# Patient Record
Sex: FEMALE | Race: WHITE | URBAN - METROPOLITAN AREA
[De-identification: names, ages, dates, MRNs, and addresses within clinical notes are randomized per-mention and may not be internally consistent; named-entity substitution may affect disease eponyms.]

---

## 2020-12-18 ENCOUNTER — TESTING ONLY (OUTPATIENT)
Dept: URBAN - METROPOLITAN AREA CLINIC 33 | Facility: CLINIC | Age: 30
End: 2020-12-18

## 2020-12-18 ENCOUNTER — OFFICE VISIT (OUTPATIENT)
Dept: URBAN - METROPOLITAN AREA CLINIC 33 | Facility: CLINIC | Age: 30
End: 2020-12-18
Payer: COMMERCIAL

## 2020-12-18 DIAGNOSIS — H52.223 REGULAR ASTIGMATISM, BILATERAL: Primary | ICD-10-CM

## 2020-12-18 DIAGNOSIS — H18.613 KERATOCONUS, STABLE, BILATERAL: Primary | ICD-10-CM

## 2020-12-18 PROCEDURE — 76514 ECHO EXAM OF EYE THICKNESS: CPT | Performed by: OPHTHALMOLOGY

## 2020-12-18 PROCEDURE — 99203 OFFICE O/P NEW LOW 30 MIN: CPT | Performed by: OPHTHALMOLOGY

## 2020-12-18 PROCEDURE — 92025 CPTRIZED CORNEAL TOPOGRAPHY: CPT | Performed by: OPHTHALMOLOGY

## 2020-12-18 ASSESSMENT — KERATOMETRY
OS: 47.13
OD: 50.88

## 2020-12-18 ASSESSMENT — INTRAOCULAR PRESSURE
OS: 11
OD: 10
OS: 11
OD: 10

## 2020-12-18 ASSESSMENT — VISUAL ACUITY
OS: 20/50
OD: 20/60

## 2020-12-18 NOTE — IMPRESSION/PLAN
Impression: Regular astigmatism, bilateral: H52.223. 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 Issued new prescription. Recommend crosslinking evaluation and scleral CL.

## 2020-12-18 NOTE — IMPRESSION/PLAN
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 Condition: quality of life issue. Vision: vision affected. Plan: Discussed diagnosis in detail with patient. Discussed risks of progression. Discussed treatment options with patient. Recommend consultation with Dr Will Fournier for crosslinking. OK to proceed with speciality contact lens fitting with Dr Gao Begin at patient convenience.